# Patient Record
(demographics unavailable — no encounter records)

---

## 2024-10-21 NOTE — IMAGING
[Facet arthropathy] : Facet arthropathy [Disc space narrowing] : Disc space narrowing [Scoliosis] : Scoliosis [Spondylolithesis] : Spondylolithesis [AP] : anteroposterior [Moderate arthritis (Tonnis Grade 2)] : Moderate arthritis (Tonnis Grade 2) [de-identified] : Back: - No obvious deformity - No pain with palpation of spinous processes - Bilateral lumbar and R gluteal tenderness to palpation - No pain with SI palpation - ROM intact throughout sidebending, and rotation - ROM limited with extension and flexion  - 4/5 strength with R hip flexion, r ankle/great toe dorsiflexion - 5/5 strength throughout LE evaluation bilaterally otherwise - No pain with ANANDA/FADIR - R buttock pain with SLR.  - Negative straight leg raise L - Distally neurovascularly intact   R knee: - No obvious deformity or swelling - No pain with palpation of medial or lateral joint line. - ROM from 125 degrees of flexion to 0 degrees extension. - 5/5 strength with knee flexion and extension - Stable varus, valgus testing - Distally neurovascularly intact.

## 2024-10-21 NOTE — ASSESSMENT
[FreeTextEntry1] : RLE complaints originating around the R knee but then becoming more radicular in nature. XR showing significant DDD. MRI from 1/16/24 showing the same - PT referral - Tylenol prn - Discussed epidural injections which she is currently not interested in - Follow up in 4-6 weeks

## 2024-10-21 NOTE — HISTORY OF PRESENT ILLNESS
[de-identified] : 10/21/2024: Patient is a 77 year female presenting for evaluation of pain she was initially experiencing in the lateral aspect of her R lower thigh above the knee. She states it felt swollen to her. Then, pain spread down towards her foot and up to the lateral aspect of her hip and R lower back. She feels pain with changing positions, walking. Also has some pain that has developed in her upper back and shoulders. Not taking anything for pain. States stretching helps.

## 2024-12-06 NOTE — HISTORY OF PRESENT ILLNESS
[FreeTextEntry1] : HARJEET MCCLURE is a 78-year-old presenting for evaluation of renal cyst. Pt has a longstanding history musculoskeletal pain and has been following with ortho and PT. She had a recent MRI that noted renal cysts and was recommended to follow-up with Urology by her Ortho.  Today, patient offers complaint of urinary urgency and frequency starting yesterday, as well as some pain with urination but no dysuria. No gross hematuria.  She has a longstanding h/o pelvic floor disorders. She has a h/o hysterectomy (1983), abdominal sacral colpopexy with paravaginal repairs (2010), and anterior vaginal repair (2011). She reports persistent vaginal bulge on/off that is at time uncomfortable.   Of note, pt was evaluated for urgency and UUI by Dr. Mosley in 2018, at which time behavioral modifications and pharmacotherapy were recommended.  She also saw Dr. Lemus in 3/2024 and was diagnosed with osteitis pubis.

## 2024-12-06 NOTE — PROCEDURE
[FreeTextEntry1] : A sterile straight catheterization was performed to rule out a urinary tract infection and measure postvoid residual volume, which was 30 ml.

## 2024-12-06 NOTE — PHYSICAL EXAM
[Chaperone Present] : A chaperone was present in the examining room during all aspects of the physical examination [88677] : A chaperone was present during the pelvic exam. [FreeTextEntry2] : AURORA Almodovar [FreeTextEntry1] : General: Well, appearing, no acute distress HEENT: Normocephalic, atraumatic Respiratory: Speaking in full sentences comfortably, normal work of breathing and no cough during visit Extremities: No upper extremity edema noted Skin: No obvious rash or skin lesions Neuro: Alert and oriented x 3, speech is fluent, normal rate Psych: Normal mood and affect [No Lesions] : no lesions were seen on the external genitalia [Normal] : was normal [Atrophy] : atrophy [Dry Mucosa] : dry mucosa [Cystocele] : a cystocele [Aa ____] : Aa [unfilled] [Ba ____] : Ba [unfilled] [Absent] : absent

## 2025-05-29 NOTE — IMAGING
[de-identified] : right hand no swelling or deformity no ttp -thenar atrophy full active range of motion decreased sensation to the median nerve intact ulnar and radial sensation ain/pin/ulnar motor intact +tinels, phalens and durkans, negative spurling sign palpable pulses with CR<2s full motion to the elbow, shoulder   left hand no swelling or deformity no ttp -thenar atrophy full active range of motion decreased sensation to the median nerve intact ulnar and radial sensation ain/pin/ulnar motor intact +tinels, phalens and durkans, negative spurling sign palpable pulses with CR<2s full motion to the elbow, shoulder   Bilateral hand 3 view x-ray: bilateral 1st cmc djd

## 2025-05-29 NOTE — ASSESSMENT
[FreeTextEntry1] : The patient was advised of the diagnosis.  The natural history of the pathology was explained in full to the patient in layman's terms. We discussed the nature of the nerve as an electrical cable and what happens to the nerve in carpal tunnel syndrome.  We discussed that treatment for night symptoms included night bracing.  We discussed the possibility of injection when symptoms were intermittent or in patients who were unwilling to undergo surgery with constant symptoms.  We discussed that injection is a diagnostic and therapeutic aide and what this means.  We discussed the use of nerve testing in cases when diagnosis was in doubt or for confirmation to exclude alternate pathology.  We discussed that if symptoms were 24/7 surgery was recommended to give the nerve the best chance to recover but that once symptoms were constant, the nerve may not recover even with surgery.  We discussed that if left alone the nerve progression could worsen and that treatment was indicated to prevent progression of nerve compression.  The longer the nerve is left, the more likely to cause worsening irreversible damage.  All questions were answered.  The risks and benefits of surgical and non-surgical treatment alternatives were explained in full to the patient.  We discussed the recommendation for carpal tunnel surgery- We reviewed that the goal of surgery is to prevent worsening and give the nerve a chance to recover. If symptoms are constant there is a chance that the nerve is already too badly damaged and no longer has the potential to recover. Risks, benefits, and alternatives of surgery discussed with patient (and family). Risks including but not limited to infection, blood loss, tendon damage, muscle damage, nerve damage, stiffness, pain, no resolution of symptoms, CRPS, loss of function, potential for secondary surgery, loss of limb or life. Patient understands and was allowed to voice questions or concerns. They agree to surgery.   Plan for left endoscopic carpal tunnel release.  RTO after surgery.

## 2025-05-29 NOTE — HISTORY OF PRESENT ILLNESS
[de-identified] : 05/29/2025: rhd 79 yo female here with left > right hand pain. pt states right hand was operated on by Dr. Aranda for CTR. Pt complains of tingling / pain to the bilateral hands but left much worse  PMH: HTN, Hyperlipidemia, DM2, Hypothyroidism, carotid stenosis (on ASA), Pulmonary Embolism. Allergies: Biaxin, Codeine, EES, PCN, Sulfa, Azithromycin. Occupation: Retired teacher.

## 2025-07-17 NOTE — HISTORY OF PRESENT ILLNESS
[de-identified] : 07/17/2025: pt here for f/u of left carpal tunnel syndrome.  05/29/2025: rhd 77 yo female here with left > right hand pain. pt states right hand was operated on by Dr. Aranda for CTR. Pt complains of tingling / pain to the bilateral hands but left much worse  PMH: HTN, Hyperlipidemia, DM2, Hypothyroidism, carotid stenosis (on ASA), Pulmonary Embolism. Allergies: Biaxin, Codeine, EES, PCN, Sulfa, Azithromycin. Occupation: Retired teacher.

## 2025-07-17 NOTE — IMAGING
[de-identified] : right hand no swelling or deformity no ttp -thenar atrophy full active range of motion decreased sensation to the median nerve intact ulnar and radial sensation ain/pin/ulnar motor intact +tinels, phalens and durkans, negative spurling sign palpable pulses with CR<2s full motion to the elbow, shoulder   left hand no swelling or deformity no ttp small volar cyst noted to the radial wrist that is  +thenar atrophy full active range of motion decreased sensation to the median nerve intact ulnar and radial sensation ain/pin/ulnar motor intact +tinels, phalens and durkans, negative spurling sign palpable pulses with CR<2s full motion to the elbow, shoulder   Bilateral hand 3 view x-ray: bilateral 1st cmc djd

## 2025-07-17 NOTE — ASSESSMENT
[FreeTextEntry1] : The patient was advised of the diagnosis.  The natural history of the pathology was explained in full to the patient in layman's terms. We discussed the nature of the nerve as an electrical cable and what happens to the nerve in carpal tunnel syndrome.  We discussed that treatment for night symptoms included night bracing.  We discussed the possibility of injection when symptoms were intermittent or in patients who were unwilling to undergo surgery with constant symptoms.  We discussed that injection is a diagnostic and therapeutic aide and what this means.  We discussed the use of nerve testing in cases when diagnosis was in doubt or for confirmation to exclude alternate pathology.  We discussed that if symptoms were 24/7 surgery was recommended to give the nerve the best chance to recover but that once symptoms were constant, the nerve may not recover even with surgery.  We discussed that if left alone the nerve progression could worsen and that treatment was indicated to prevent progression of nerve compression.  The longer the nerve is left, the more likely to cause worsening irreversible damage.  All questions were answered.  The risks and benefits of surgical and non-surgical treatment alternatives were explained in full to the patient.  We discussed the recommendation for carpal tunnel surgery- We reviewed that the goal of surgery is to prevent worsening and give the nerve a chance to recover. If symptoms are constant there is a chance that the nerve is already too badly damaged and no longer has the potential to recover. Risks, benefits, and alternatives of surgery discussed with patient (and family). Risks including but not limited to infection, blood loss, tendon damage, muscle damage, nerve damage, stiffness, pain, no resolution of symptoms, CRPS, loss of function, potential for secondary surgery, loss of limb or life. Patient understands and was allowed to voice questions or concerns. They agree to surgery.   Plan for left endoscopic carpal tunnel release.  Pt informed she will likely not regain strength to her hand due to her delaying her care. However the majority of her pain should improve s/p decompression of the left carpal tunnel.    RTO after surgery.